# Patient Record
Sex: MALE | Race: WHITE | NOT HISPANIC OR LATINO | Employment: PART TIME | URBAN - METROPOLITAN AREA
[De-identification: names, ages, dates, MRNs, and addresses within clinical notes are randomized per-mention and may not be internally consistent; named-entity substitution may affect disease eponyms.]

---
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
## 2018-02-20 ENCOUNTER — APPOINTMENT (OUTPATIENT)
Dept: RADIOLOGY | Facility: MEDICAL CENTER | Age: 18
End: 2018-02-20
Payer: COMMERCIAL

## 2018-02-20 ENCOUNTER — TRANSCRIBE ORDERS (OUTPATIENT)
Dept: ADMINISTRATIVE | Facility: HOSPITAL | Age: 18
End: 2018-02-20

## 2018-02-20 DIAGNOSIS — M54.9 BACK PAIN, UNSPECIFIED BACK LOCATION, UNSPECIFIED BACK PAIN LATERALITY, UNSPECIFIED CHRONICITY: ICD-10-CM

## 2018-02-20 DIAGNOSIS — M41.119 JUVENILE IDIOPATHIC SCOLIOSIS, UNSPECIFIED SPINAL REGION: ICD-10-CM

## 2018-02-20 DIAGNOSIS — M54.9 BACK PAIN, UNSPECIFIED BACK LOCATION, UNSPECIFIED BACK PAIN LATERALITY, UNSPECIFIED CHRONICITY: Primary | ICD-10-CM

## 2018-02-20 PROCEDURE — 72082 X-RAY EXAM ENTIRE SPI 2/3 VW: CPT

## 2019-02-16 ENCOUNTER — HOSPITAL ENCOUNTER (INPATIENT)
Facility: HOSPITAL | Age: 19
LOS: 2 days | Discharge: HOME | DRG: 242 | End: 2019-02-18
Attending: EMERGENCY MEDICINE | Admitting: HOSPITALIST
Payer: COMMERCIAL

## 2019-02-16 DIAGNOSIS — R10.84 GENERALIZED ABDOMINAL PAIN: ICD-10-CM

## 2019-02-16 DIAGNOSIS — K92.2 UPPER GI BLEED: ICD-10-CM

## 2019-02-16 DIAGNOSIS — R63.4 UNINTENDED WEIGHT LOSS: ICD-10-CM

## 2019-02-16 DIAGNOSIS — I95.1 ORTHOSTATIC HYPOTENSION: ICD-10-CM

## 2019-02-16 DIAGNOSIS — K92.0 HEMATEMESIS WITH NAUSEA: Primary | ICD-10-CM

## 2019-02-16 DIAGNOSIS — K92.2 GI BLEED: ICD-10-CM

## 2019-02-16 DIAGNOSIS — Z72.0 TOBACCO USE: ICD-10-CM

## 2019-02-16 DIAGNOSIS — K92.0 COFFEE GROUND EMESIS: ICD-10-CM

## 2019-02-16 PROBLEM — M41.9 SCOLIOSIS: Status: ACTIVE | Noted: 2019-02-16

## 2019-02-16 PROBLEM — F12.10 MARIJUANA ABUSE: Status: ACTIVE | Noted: 2019-02-16

## 2019-02-16 LAB
ABO GROUP BLD: NORMAL
ALBUMIN SERPL BCP-MCNC: 4.4 G/DL (ref 3.5–5)
ALP SERPL-CCNC: 80 U/L (ref 46–116)
ALT SERPL W P-5'-P-CCNC: 21 U/L (ref 12–78)
ANION GAP SERPL CALCULATED.3IONS-SCNC: 10 MMOL/L (ref 4–13)
APTT PPP: 28 SECONDS (ref 26–38)
AST SERPL W P-5'-P-CCNC: 14 U/L (ref 5–45)
BACTERIA UR QL AUTO: ABNORMAL /HPF
BASOPHILS # BLD AUTO: 0.05 THOUSANDS/ΜL (ref 0–0.1)
BASOPHILS NFR BLD AUTO: 1 % (ref 0–1)
BILIRUB SERPL-MCNC: 0.92 MG/DL (ref 0.2–1)
BILIRUB UR QL STRIP: NEGATIVE
BLD GP AB SCN SERPL QL: NEGATIVE
BUN SERPL-MCNC: 11 MG/DL (ref 5–25)
CALCIUM SERPL-MCNC: 9 MG/DL (ref 8.3–10.1)
CHLORIDE SERPL-SCNC: 104 MMOL/L (ref 100–108)
CLARITY UR: ABNORMAL
CO2 SERPL-SCNC: 29 MMOL/L (ref 21–32)
COLOR UR: YELLOW
CREAT SERPL-MCNC: 0.77 MG/DL (ref 0.6–1.3)
EOSINOPHIL # BLD AUTO: 0.27 THOUSAND/ΜL (ref 0–0.61)
EOSINOPHIL NFR BLD AUTO: 5 % (ref 0–6)
ERYTHROCYTE [DISTWIDTH] IN BLOOD BY AUTOMATED COUNT: 11.9 % (ref 11.6–15.1)
GFR SERPL CREATININE-BSD FRML MDRD: 131 ML/MIN/1.73SQ M
GLUCOSE SERPL-MCNC: 96 MG/DL (ref 65–140)
GLUCOSE UR STRIP-MCNC: NEGATIVE MG/DL
HCT VFR BLD AUTO: 40.1 % (ref 36.5–49.3)
HGB BLD-MCNC: 13.5 G/DL (ref 12–17)
HGB UR QL STRIP.AUTO: ABNORMAL
IMM GRANULOCYTES # BLD AUTO: 0.01 THOUSAND/UL (ref 0–0.2)
IMM GRANULOCYTES NFR BLD AUTO: 0 % (ref 0–2)
INR PPP: 1.15 (ref 0.86–1.17)
KETONES UR STRIP-MCNC: NEGATIVE MG/DL
LACTATE SERPL-SCNC: 0.7 MMOL/L (ref 0.5–2)
LEUKOCYTE ESTERASE UR QL STRIP: NEGATIVE
LYMPHOCYTES # BLD AUTO: 1.61 THOUSANDS/ΜL (ref 0.6–4.47)
LYMPHOCYTES NFR BLD AUTO: 27 % (ref 14–44)
MCH RBC QN AUTO: 31 PG (ref 26.8–34.3)
MCHC RBC AUTO-ENTMCNC: 33.7 G/DL (ref 31.4–37.4)
MCV RBC AUTO: 92 FL (ref 82–98)
MONOCYTES # BLD AUTO: 0.51 THOUSAND/ΜL (ref 0.17–1.22)
MONOCYTES NFR BLD AUTO: 9 % (ref 4–12)
NEUTROPHILS # BLD AUTO: 3.44 THOUSANDS/ΜL (ref 1.85–7.62)
NEUTS SEG NFR BLD AUTO: 58 % (ref 43–75)
NITRITE UR QL STRIP: NEGATIVE
NON-SQ EPI CELLS URNS QL MICRO: ABNORMAL /HPF
NRBC BLD AUTO-RTO: 0 /100 WBCS
PH UR STRIP.AUTO: 6 [PH] (ref 4.5–8)
PLATELET # BLD AUTO: 225 THOUSANDS/UL (ref 149–390)
PMV BLD AUTO: 9.5 FL (ref 8.9–12.7)
POTASSIUM SERPL-SCNC: 3.5 MMOL/L (ref 3.5–5.3)
PROT SERPL-MCNC: 7.1 G/DL (ref 6.4–8.2)
PROT UR STRIP-MCNC: ABNORMAL MG/DL
PROTHROMBIN TIME: 14.8 SECONDS (ref 11.8–14.2)
RBC # BLD AUTO: 4.35 MILLION/UL (ref 3.88–5.62)
RBC #/AREA URNS AUTO: ABNORMAL /HPF
RH BLD: POSITIVE
SODIUM SERPL-SCNC: 143 MMOL/L (ref 136–145)
SP GR UR STRIP.AUTO: >=1.03 (ref 1–1.03)
SPECIMEN EXPIRATION DATE: NORMAL
UROBILINOGEN UR QL STRIP.AUTO: 0.2 E.U./DL
WBC # BLD AUTO: 5.89 THOUSAND/UL (ref 4.31–10.16)
WBC #/AREA URNS AUTO: ABNORMAL /HPF

## 2019-02-16 PROCEDURE — 96365 THER/PROPH/DIAG IV INF INIT: CPT

## 2019-02-16 PROCEDURE — 86900 BLOOD TYPING SEROLOGIC ABO: CPT | Performed by: NURSE PRACTITIONER

## 2019-02-16 PROCEDURE — 99285 EMERGENCY DEPT VISIT HI MDM: CPT

## 2019-02-16 PROCEDURE — 36415 COLL VENOUS BLD VENIPUNCTURE: CPT | Performed by: NURSE PRACTITIONER

## 2019-02-16 PROCEDURE — 81003 URINALYSIS AUTO W/O SCOPE: CPT

## 2019-02-16 PROCEDURE — 85025 COMPLETE CBC W/AUTO DIFF WBC: CPT | Performed by: NURSE PRACTITIONER

## 2019-02-16 PROCEDURE — 99222 1ST HOSP IP/OBS MODERATE 55: CPT | Performed by: PHYSICIAN ASSISTANT

## 2019-02-16 PROCEDURE — 80053 COMPREHEN METABOLIC PANEL: CPT | Performed by: NURSE PRACTITIONER

## 2019-02-16 PROCEDURE — 86901 BLOOD TYPING SEROLOGIC RH(D): CPT | Performed by: NURSE PRACTITIONER

## 2019-02-16 PROCEDURE — 83605 ASSAY OF LACTIC ACID: CPT | Performed by: NURSE PRACTITIONER

## 2019-02-16 PROCEDURE — C9113 INJ PANTOPRAZOLE SODIUM, VIA: HCPCS | Performed by: NURSE PRACTITIONER

## 2019-02-16 PROCEDURE — 85730 THROMBOPLASTIN TIME PARTIAL: CPT | Performed by: NURSE PRACTITIONER

## 2019-02-16 PROCEDURE — 85610 PROTHROMBIN TIME: CPT | Performed by: NURSE PRACTITIONER

## 2019-02-16 PROCEDURE — 81001 URINALYSIS AUTO W/SCOPE: CPT

## 2019-02-16 PROCEDURE — 84443 ASSAY THYROID STIM HORMONE: CPT | Performed by: PHYSICIAN ASSISTANT

## 2019-02-16 PROCEDURE — 86850 RBC ANTIBODY SCREEN: CPT | Performed by: NURSE PRACTITIONER

## 2019-02-16 RX ORDER — LORAZEPAM 2 MG/ML
0.5 INJECTION INTRAMUSCULAR EVERY 6 HOURS PRN
Status: DISCONTINUED | OUTPATIENT
Start: 2019-02-16 | End: 2019-02-18 | Stop reason: HOSPADM

## 2019-02-16 RX ORDER — PANTOPRAZOLE SODIUM 40 MG/1
40 INJECTION, POWDER, FOR SOLUTION INTRAVENOUS ONCE
Status: DISCONTINUED | OUTPATIENT
Start: 2019-02-16 | End: 2019-02-16

## 2019-02-16 RX ORDER — SODIUM CHLORIDE 9 MG/ML
100 INJECTION, SOLUTION INTRAVENOUS CONTINUOUS
Status: DISCONTINUED | OUTPATIENT
Start: 2019-02-16 | End: 2019-02-18

## 2019-02-16 RX ORDER — PANTOPRAZOLE SODIUM 40 MG/1
40 INJECTION, POWDER, FOR SOLUTION INTRAVENOUS EVERY 12 HOURS SCHEDULED
Status: DISCONTINUED | OUTPATIENT
Start: 2019-02-17 | End: 2019-02-18

## 2019-02-16 RX ORDER — ONDANSETRON 2 MG/ML
4 INJECTION INTRAMUSCULAR; INTRAVENOUS EVERY 6 HOURS PRN
Status: DISCONTINUED | OUTPATIENT
Start: 2019-02-16 | End: 2019-02-18 | Stop reason: HOSPADM

## 2019-02-16 RX ORDER — HYDROXYZINE HYDROCHLORIDE 10 MG/1
10 TABLET, FILM COATED ORAL ONCE
Status: COMPLETED | OUTPATIENT
Start: 2019-02-16 | End: 2019-02-16

## 2019-02-16 RX ADMIN — SODIUM CHLORIDE 100 ML/HR: 0.9 INJECTION, SOLUTION INTRAVENOUS at 21:22

## 2019-02-16 RX ADMIN — SODIUM CHLORIDE 80 MG: 9 INJECTION, SOLUTION INTRAVENOUS at 20:51

## 2019-02-16 RX ADMIN — HYDROXYZINE HYDROCHLORIDE 10 MG: 10 TABLET ORAL at 21:44

## 2019-02-16 NOTE — LETTER
[address] [address]  [address] [place] [id number]  Dept: [phone]    February 18, 2019     Patient: Sergio Anthony   YOB: 2000   Date of Visit: 2/16/2019       To Whom it May Concern:    Forest Jami is under my professional care  He was seen in the hospital from 2/16/2019   to 02/18/19  He may return to work on 2/22/19  If you have any questions or concerns, please don't hesitate to call           Sincerely,          Sagar Gonzalez MD

## 2019-02-17 ENCOUNTER — ANESTHESIA EVENT (INPATIENT)
Dept: GASTROENTEROLOGY | Facility: HOSPITAL | Age: 19
DRG: 242 | End: 2019-02-17
Payer: COMMERCIAL

## 2019-02-17 PROBLEM — K92.0 HEMATEMESIS WITH NAUSEA: Status: ACTIVE | Noted: 2019-02-16

## 2019-02-17 PROBLEM — K92.0 COFFEE GROUND EMESIS: Status: ACTIVE | Noted: 2019-02-16

## 2019-02-17 PROBLEM — K92.2 GI BLEED: Status: ACTIVE | Noted: 2019-02-16

## 2019-02-17 PROBLEM — R63.4 UNINTENDED WEIGHT LOSS: Status: ACTIVE | Noted: 2019-02-16

## 2019-02-17 LAB
HGB BLD-MCNC: 11.8 G/DL (ref 12–17)
HGB BLD-MCNC: 12.4 G/DL (ref 12–17)
HGB BLD-MCNC: 12.6 G/DL (ref 12–17)
TSH SERPL DL<=0.05 MIU/L-ACNC: 2.26 UIU/ML (ref 0.46–3.98)

## 2019-02-17 PROCEDURE — 85018 HEMOGLOBIN: CPT | Performed by: HOSPITALIST

## 2019-02-17 PROCEDURE — 99232 SBSQ HOSP IP/OBS MODERATE 35: CPT | Performed by: HOSPITALIST

## 2019-02-17 PROCEDURE — C9113 INJ PANTOPRAZOLE SODIUM, VIA: HCPCS | Performed by: PHYSICIAN ASSISTANT

## 2019-02-17 PROCEDURE — 99254 IP/OBS CNSLTJ NEW/EST MOD 60: CPT | Performed by: INTERNAL MEDICINE

## 2019-02-17 PROCEDURE — 85018 HEMOGLOBIN: CPT | Performed by: PHYSICIAN ASSISTANT

## 2019-02-17 RX ADMIN — SODIUM CHLORIDE 100 ML/HR: 0.9 INJECTION, SOLUTION INTRAVENOUS at 06:04

## 2019-02-17 RX ADMIN — PANTOPRAZOLE SODIUM 40 MG: 40 INJECTION, POWDER, FOR SOLUTION INTRAVENOUS at 08:55

## 2019-02-17 RX ADMIN — LORAZEPAM 0.5 MG: 2 INJECTION INTRAMUSCULAR; INTRAVENOUS at 10:38

## 2019-02-17 RX ADMIN — SODIUM CHLORIDE 100 ML/HR: 0.9 INJECTION, SOLUTION INTRAVENOUS at 16:01

## 2019-02-17 RX ADMIN — LORAZEPAM 0.5 MG: 2 INJECTION INTRAMUSCULAR; INTRAVENOUS at 19:25

## 2019-02-17 RX ADMIN — PANTOPRAZOLE SODIUM 40 MG: 40 INJECTION, POWDER, FOR SOLUTION INTRAVENOUS at 20:40

## 2019-02-17 NOTE — ED NOTES
Patient rang call light, requesting medication for anxiety  ANNA Ovalle Sample notified       Shahid Li RN  02/16/19 6276

## 2019-02-17 NOTE — ASSESSMENT & PLAN NOTE
Patient reports one episode of vomiting mixed coffee ground emesis with bright red blood this afternoon after taking 2 ibuprofen   Denies habitual use of NSAIDs or history of GI bleed  Reports history of anemia as a child, unknown cause- resolved spontaneously   Hemoccult positive rectally   HgB 13 5 on admission, serial H&H   NPO with IV fluid hydration   GI consult   IV protonix

## 2019-02-17 NOTE — PROGRESS NOTES
Progress Note - Jose Lowers 2000, 19 y o  male MRN: [id number]    Unit/Bed#: [facility] [address] 2 [address] 212-02 Encounter: [id number]    Primary Care Provider: Abraham Colon MD   Date and time admitted to hospital: 2019  7:55 PM        * Hematemesis/vomiting blood  Assessment & Plan  One episode of coffee ground emesis  No diarhea  No bloody nor dark stools  No NSAID use  No recent antibiotics  He has never had this problem before  Continue IV protonix  For EGD tomorrow    Marijuana abuse  Assessment & Plan  Needs to quit  Probably contributing to nausea          Subjective:   Feels better  Has mild epigastric pain to palpation  No further nausea or vomiting      Objective:     Vitals:   Temp (24hrs), Av 3 °F (36 8 °C), Min:97 9 °F (36 6 °C), Max:99 °F (37 2 °C)    Temp:  [97 9 °F (36 6 °C)-99 °F (37 2 °C)] 99 °F (37 2 °C)  HR:  [53-78] 53  Resp:  [14-18] 18  BP: ()/(53-67) 82/53  SpO2:  [95 %-98 %] 98 %  There is no height or weight on file to calculate BMI  Input and Output Summary (last 24 hours): Intake/Output Summary (Last 24 hours) at 2019 1252  Last data filed at 2019 0604  Gross per 24 hour   Intake 870 ml   Output --   Net 870 ml       Physical Exam:     Physical Exam   HENT:   Head: Normocephalic and atraumatic  Eyes: Pupils are equal, round, and reactive to light  EOM are normal    Cardiovascular: Normal rate and regular rhythm  Exam reveals no gallop and no friction rub  No murmur heard  Pulmonary/Chest: Effort normal and breath sounds normal  He has no wheezes  He has no rales  Abdominal: Soft  Bowel sounds are normal  There is tenderness (very mild epigastric tenderness to deep palpation  )  There is no rebound and no guarding  Musculoskeletal: He exhibits no edema  Nursing note and vitals reviewed              Additional Data:     Labs:    Results from last 7 days   Lab Units 19  0558 19   WBC Thousand/uL  --  5 89   HEMOGLOBIN g/dL 12 4 13 5   HEMATOCRIT %  --  40 1   PLATELETS Thousands/uL  --  225   NEUTROS PCT %  --  58   LYMPHS PCT %  --  27   MONOS PCT %  --  9   EOS PCT %  --  5     Results from last 7 days   Lab Units 02/16/19 2030   POTASSIUM mmol/L 3 5   CHLORIDE mmol/L 104   CO2 mmol/L 29   BUN mg/dL 11   CREATININE mg/dL 0 77   CALCIUM mg/dL 9 0   ALK PHOS U/L 80   ALT U/L 21   AST U/L 14     Results from last 7 days   Lab Units 02/16/19 2030   INR  1 15                   * I Have Reviewed All Lab Data     Recent Cultures (last 7 days):             Last 24 Hours Medication List:     Current Facility-Administered Medications:  LORazepam 0 5 mg Intravenous Q6H PRN Iman Leiva PA-C    nicotine 1 patch Transdermal Daily Dolly Yeceniade PA-JUANITO    ondansetron 4 mg Intravenous Q6H PRN Dolly Smashkande, PA-C    pantoprazole 40 mg Intravenous Q12H [address] Dolly Smudde, PA-C    sodium chloride 100 mL/hr Intravenous Continuous Dolly Smashkande, PA-C Last Rate: 100 mL/hr (02/17/19 0604)         VTE Pharmacologic Prophylaxis:   Pharmacologic: ambulating      Current Length of Stay: 1 day(s)    Current Patient Status: Inpatient       Discharge Plan: home tomorrow after EGD depending on results    Code Status: Level 1 - Full Code           Today, Patient Was Seen By: Dennys Pool DO    ** Please Note: Dictation voice to text software may have been used in the creation of this document   **

## 2019-02-17 NOTE — UTILIZATION REVIEW
Initial Clinical Review    Admission: Date/Time/Statement: 2/16/19 @ 2128   Orders Placed This Encounter   Procedures    Inpatient Admission (expected length of stay for this patient Order details is greater than two midnights)     Standing Status:   Standing     Number of Occurrences:   1     Order Specific Question:   Admitting Physician     Answer:   Domitila Small [[id number]]     Order Specific Question:   Level of Care     Answer:   Med Surg [16]     Order Specific Question:   Estimated length of stay     Answer:   More than 2 Midnights     Order Specific Question:   Certification     Answer:   I certify that inpatient services are medically necessary for this patient for a duration of greater than two midnights  See H&P and MD Progress Notes for additional information about the patient's course of treatment  ED: Date/Time/Mode of Arrival:   ED Arrival Information     Expected Arrival Acuity Means of Arrival Escorted By Service Admission Type    - 2/16/2019 19:47 Urgent Walk-In Family Member General Medicine Urgent    Arrival Complaint    vomiting blood        Chief Complaint:   Chief Complaint   Patient presents with    Vomiting Blood     pt states he started with headache with vomiting that was dark and bright red  feels dizzy at this time  also reports mid abdominal pain  History of Illness: 19year old male with a PMH of scoliosis and unknown type of anemia who smokes marijuana daily and vapes who comes to the ED with c/o vomiting coffee ground emesis and blood at work  He states he had a headache, took some ibuprofen while at work, became nauseated and them vomited  He called his mother and was told to come to ED  Pt states he does have an uncomfortable feeling in his stomach  He denies alcohol use or daily use of NSAIDS  Denies cyclic vomiting related to marijuana use  Denies noting any blood in his stool   Last BM today and was normal   Pt states he notices dizziness and lightheadedness with getting up and down      ED Vital Signs:   ED Triage Vitals   Temperature Pulse Respirations Blood Pressure SpO2   02/16/19 1952 02/16/19 1952 02/16/19 1952 02/16/19 1952 02/16/19 1952   97 9 °F (36 6 °C) 78 14 118/67 97 %      Temp Source Heart Rate Source Patient Position - Orthostatic VS BP Location FiO2 (%)   02/16/19 1952 02/17/19 0759 02/16/19 1952 02/16/19 1952 --   Temporal Monitor Sitting Right arm       Pain Score       02/16/19 2154       2        Wt Readings from Last 1 Encounters:   02/16/19 48 6 kg (107 lb 2 3 oz)     H/H 13 5/40 1  PT 14 8  INR 1 15  URINE  SMALL BLOOD  (+) 1 PROTEIN RBC 2-4  WBC  4-10      ED Treatment:   Medication Administration from 02/16/2019 1947 to 02/16/2019 2211       Date/Time Order Dose Route Action Action by Comments     02/16/2019 2122 pantoprazole (PROTONIX) 80 mg in sodium chloride 0 9 % 100 mL IVPB 0 mg Intravenous Stopped Gerson Brown, RN      02/16/2019 2051 pantoprazole (PROTONIX) 80 mg in sodium chloride 0 9 % 100 mL IVPB 80 mg Intravenous [address] Gaynel Levels, RN      02/16/2019 2122 sodium chloride 0 9 % infusion 100 mL/hr Intravenous [address] Gaynel Levels, RN      02/16/2019 2144 hydrOXYzine HCL (ATARAX) tablet 10 mg 10 mg Oral Given Gerson Brown, RN         Past Medical/Surgical History:    Active Ambulatory Problems     Diagnosis Date Noted    No Active Ambulatory Problems     Resolved Ambulatory Problems     Diagnosis Date Noted    No Resolved Ambulatory Problems     Past Medical History:   Diagnosis Date    Anemia     Scoliosis      Admitting Diagnosis: Vomiting blood [K92 0]  Generalized abdominal pain [R10 84]  GI bleed [K92 2]  Coffee ground emesis [K92 0]  Hematemesis with nausea [K92 0]  Age/Sex: 19 y o  male  Assessment/Plan: 18 YO MALE PRESENT IN ER WITH COFFEE GROUND EMESIS  ABD TENDERNESS    Admission Orders:  Scheduled Meds:   Current Facility-Administered Medications:  LORazepam 0 5 mg Intravenous Q6H PRN Dolly Monique PA-C    nicotine 1 patch Transdermal Daily Dolly Monique PA-C    ondansetron 4 mg Intravenous Q6H PRN Dolly Monique PA-C    pantoprazole 40 mg Intravenous Q12H [facility] Dolly Monique PA-C    sodium chloride 100 mL/hr Intravenous Continuous Dolly Monique PA-C Last Rate: 100 mL/hr (02/17/19 0604)     Continuous Infusions:   sodium chloride 100 mL/hr Last Rate: 100 mL/hr (02/17/19 0604)     CLEARS    NPO AT MIDNIGHT FOR UPPER ENDOSCOPY    HEMGLOBIN Q 8 HRS

## 2019-02-17 NOTE — CONSULTS
Consultation -  [facility]  Champ Leon 19 y o  male MRN: [id number]  Unit/Bed#: [facility] [address] [id number] Encounter: [id number]        ASSESSMENT/PLAN:   70-year-old male with past medical history of scoliosis and anxiety presents to the hospital for 1 episode of coffee-ground and bloody emesis  1  Hematemesis  2  Unintended weight loss  3  Drug abuse   -his hemoglobin has been stable at 13 5, he has had no further episodes of hematemesis or vomiting since admission to the hospital   He denies any similar prior episodes or chronic illness    -differential diagnosis is broad but does include peptic ulcer disease, Dieulafoy's lesion, gastric or esophageal erosions less likely a Umu-Griffin tear from his history  He denies history of heartburn and does not have any significant identified risk factors for PUD aside from nicotine use  -recommend an upper endoscopy for further evaluation, discussed this with him and his parents and he is agreeable  Will plan for this tomorrow, NPO after midnight    -advance diet to clear liquids, if his hemoglobin remained stable today, could consider advancing diet further dinner    -ordered TSH, celiac panel and fecal calprotectin to further evaluate his unintended weight loss     -ordered stool H pylori antigen test, this may be falsely negative in the setting of bleeding and PPI treatment however gastric biopsies may not be possible in the setting of current GI bleeding     -continue Protonix for empiric treatment of PUD/erosions   -continue to monitor bowel movements for color and consistency, continue monitor hemoglobin and transfuse if necessary    -counselled him regarding drug and smoking cessation    Inpatient consult to gastroenterology  Consult performed by: Mechelle Ann PA-C  Consult ordered by: Talia Merino PA-C          Reason for Consult / Principal Problem: Hematemesis/vomiting blood    HPI: Son Montaño is a 19 y o  year old male with a PMH of scoliosis and anxiety presents to the hospital for 1 episode of coffee-ground and bloody emesis  He reports that yesterday at work he had a headache so he took 1 dose of ibuprofen and shortly after had nausea followed by vomiting with black, coffee-ground appearing material as well as dark red blood in the vomitus  He has had no further episodes of vomiting and denies previous episodes of vomiting  He denies any hematochezia or melena  He states he felt somewhat lightheaded on admission to the hospital but is feeling slightly improved now  He reports no similar episodes to this although he does note that he lost about 10 lb over the past several months, he states that he had a GI bug that lasted approximately 2 weeks late last year with complete resolution of his symptoms  He has changed his diet recently, he has decreased his meat intake as he states that meat seems to make him lethargic and he finds it difficult to work after eating meat  He denies abdominal pain, change in appetite, difficulty swallowing, hematochezia, melena, change in bowel movements, constipation, diarrhea, fevers, chills, rashes or joint aches  He states he does not take ibuprofen on a regular basis  He does not take any steroids  He is not on any blood thinners  He denies family history of Crohn's or ulcerative colitis  He denies family history of GI malignancy  He currently smokes marijuana as well as "vapes" which contains nicotine  Review of Systems: as per HPI  Review of Systems   Constitutional: Positive for unexpected weight change  Negative for activity change, appetite change, chills, fatigue and fever  HENT: Negative for mouth sores, sore throat and trouble swallowing  Respiratory: Negative for shortness of breath  Cardiovascular: Negative for chest pain  Gastrointestinal: Positive for vomiting   Negative for abdominal distention, abdominal pain, blood in stool, constipation, diarrhea and nausea  Skin: Negative for color change, pallor, rash and wound  Neurological: Negative for tremors and syncope  All other systems reviewed and are negative  Historical Information   Past Medical History:   Diagnosis Date    Anemia     Scoliosis      History reviewed  No pertinent surgical history  Social History   Social History     Substance and Sexual Activity   Alcohol Use Yes     Social History     Substance and Sexual Activity   Drug Use No     Social History     Tobacco Use   Smoking Status Current Every Day Smoker    Packs/day: 0 25    Types: Cigarettes   Smokeless Tobacco Never Used   Tobacco Comment    vapes     History reviewed  No pertinent family history  Meds/Allergies     No medications prior to admission  Current Facility-Administered Medications   Medication Dose Route Frequency    LORazepam (ATIVAN) 2 mg/mL injection 0 5 mg  0 5 mg Intravenous Q6H PRN    nicotine (NICODERM CQ) 7 mg/24hr TD 24 hr patch 1 patch  1 patch Transdermal Daily    ondansetron (ZOFRAN) injection 4 mg  4 mg Intravenous Q6H PRN    pantoprazole (PROTONIX) injection 40 mg  40 mg Intravenous Q12H [address]    sodium chloride 0 9 % infusion  100 mL/hr Intravenous Continuous       No Known Allergies    Objective     Blood pressure (!) 82/53, pulse (!) 53, temperature 99 °F (37 2 °C), temperature source Temporal, resp  rate 18, weight 48 6 kg (107 lb 2 3 oz), SpO2 98 %  Intake/Output Summary (Last 24 hours) at 2/17/2019 1310  Last data filed at 2/17/2019 0604  Gross per 24 hour   Intake 870 ml   Output --   Net 870 ml       PHYSICAL EXAM     Physical Exam   Constitutional: He is oriented to person, place, and time  No distress  thin   HENT:   Head: Normocephalic and atraumatic  Eyes: Right eye exhibits no discharge  Left eye exhibits no discharge  No scleral icterus  Neck: Neck supple  No tracheal deviation present     Cardiovascular: Normal rate, normal heart sounds and intact distal pulses  Exam reveals no gallop and no friction rub  No murmur heard  Sinus arrhythmia    Pulmonary/Chest: Effort normal and breath sounds normal  No respiratory distress  He has no wheezes  He has no rales  He exhibits no tenderness  Abdominal: Soft  Bowel sounds are normal  He exhibits no distension and no mass  There is no tenderness  There is no rebound and no guarding  Neurological: He is alert and oriented to person, place, and time  Skin: Skin is warm and dry  Psychiatric: He has a normal mood and affect  Lab Results:   CBC:   Lab Results   Component Value Date    WBC 5 89 02/16/2019    HGB 12 4 02/17/2019    HCT 40 1 02/16/2019    MCV 92 02/16/2019     02/16/2019    MCH 31 0 02/16/2019    MCHC 33 7 02/16/2019    RDW 11 9 02/16/2019    MPV 9 5 02/16/2019    NRBC 0 02/16/2019   ,   CMP:   Lab Results   Component Value Date    K 3 5 02/16/2019     02/16/2019    CO2 29 02/16/2019    BUN 11 02/16/2019    CREATININE 0 77 02/16/2019    CALCIUM 9 0 02/16/2019    AST 14 02/16/2019    ALT 21 02/16/2019    ALKPHOS 80 02/16/2019    EGFR 131 02/16/2019   ,   Lipase: No results found for: LIPASE,  PT/INR:   Lab Results   Component Value Date    INR 1 15 02/16/2019       Patient was seen and examined by Dr Ciara Thompson  All cruz medical decisions were made by Dr Ciara Thompson  Thank you for allowing us to participate in the care of this present patient  We will follow-up with you closely

## 2019-02-17 NOTE — H&P
[address] [facility]  H&P- Gloria Grover 2000, 19 y o  male MRN: [id number]  Unit/Bed#: [facility] Encounter: [id number]  Primary Care Provider: Lincoln Willson MD   Date and time admitted to hospital: 2/16/2019  7:55 PM    Marijuana abuse  Assessment & Plan  Patient reports daily marijuana use   Recommend cessation at discharge     Scoliosis  Assessment & Plan  Patient reports chronic back pain  Denies frequent NSAID use       Tobacco use  Assessment & Plan  Patient reports vaping daily   Nicotine patch while inpatient       * Hematemesis/vomiting blood  Assessment & Plan  Patient reports one episode of vomiting mixed coffee ground emesis with bright red blood this afternoon after taking 2 ibuprofen   Denies habitual use of NSAIDs or history of GI bleed  Reports history of anemia as a child, unknown cause- resolved spontaneously   Hemoccult positive rectally   HgB 13 5 on admission, serial H&H   NPO with IV fluid hydration   GI consult   IV protonix          VTE Prophylaxis: Pharmacologic VTE Prophylaxis contraindicated due to GI bleed  / reason for no mechanical VTE prophylaxis low risk    Code Status: full code  POLST: POLST form is not discussed and not completed at this time  Discussion with family: mother at bedside     Anticipated Length of Stay:  Patient will be admitted on an Inpatient basis with an anticipated length of stay of  More than 2 midnights  Justification for Hospital Stay: GI bleed     Total Time for Visit, including Counseling / Coordination of Care: 45 minutes  Greater than 50% of this total time spent on direct patient counseling and coordination of care  Chief Complaint:   Vomiting blood     History of Present Illness:    Gloria Grover is a 19 y o  male who presents with one episode of vomiting coffee ground emesis mixed with bright red blood this afternoon  Patient reports he was working as a  at a [employer] when he developed a HA   Patient reports he took 400 mg of ibuprofen for his headache  Patient reports a few minutes later he developed abdominal pain and vomited once  Patient reports the vomit was black with streaks of red blood  Patient reports he had last eaten lunch at 11 am and this occurred a few hours after  Patient reports his headache has gone away but reports he still has some abdominal discomfort  Patient describes the discomfort as cramping mostly on his right side  Patient denies fevers, constipation, diarrhea or dizziness at this time  Patient does report feeling slightly dizzy with standing but reports this is not severe and goes away quickly  Patient denies any visible blood in stool, reports last BM was this AM and was normal  Patient denies any chest pain or shortness of breath  Patient's mother reports patient has been very stressed recently and reports she believes this may have something to do with today's events  Review of Systems:    Review of Systems   Constitutional: Negative for activity change, fatigue and fever  HENT: Negative for ear pain, nosebleeds, sinus pressure, sneezing, sore throat and trouble swallowing  Eyes: Negative for photophobia, pain and visual disturbance  Respiratory: Negative for cough, chest tightness, shortness of breath and wheezing  Cardiovascular: Negative for chest pain, palpitations and leg swelling  Gastrointestinal: Positive for abdominal pain and vomiting  Negative for diarrhea and nausea  Endocrine: Negative  Genitourinary: Negative for difficulty urinating, dysuria, flank pain and hematuria  Musculoskeletal: Positive for back pain  Negative for gait problem and neck stiffness  Skin: Negative for rash  Allergic/Immunologic: Negative  Neurological: Negative for syncope, speech difficulty, weakness and light-headedness  Hematological: Negative  Psychiatric/Behavioral: Negative for confusion and sleep disturbance     All other systems reviewed and are negative  Past Medical and Surgical History:     Past Medical History:   Diagnosis Date    Anemia     Scoliosis        History reviewed  No pertinent surgical history  Meds/Allergies:    Prior to Admission medications    Not on File     I have reviewed home medications with patient personally  Allergies: No Known Allergies    Social History:     Marital Status: Single   Occupation: line   Patient Pre-hospital Living Situation: home with family   Patient Pre-hospital Level of Mobility: ambulatory  Patient Pre-hospital Diet Restrictions: none  Substance Use History:   Social History     Substance and Sexual Activity   Alcohol Use Yes     Social History     Tobacco Use   Smoking Status Current Every Day Smoker    Packs/day: 0 25    Types: Cigarettes   Smokeless Tobacco Never Used   Tobacco Comment    vapes     Social History     Substance and Sexual Activity   Drug Use No       Family History:    History reviewed  No pertinent family history  Physical Exam:     Vitals:   Blood Pressure: 112/64 (02/16/19 2154)  Pulse: 63 (02/16/19 2154)  Temperature: 97 9 °F (36 6 °C) (02/16/19 1952)  Temp Source: Temporal (02/16/19 1952)  Respirations: 16 (02/16/19 2154)  Weight - Scale: 48 3 kg (106 lb 7 7 oz) (02/16/19 2031)  SpO2: 95 % (02/16/19 2154)    Physical Exam   Constitutional: He is oriented to person, place, and time  He is cooperative  He does not have a sickly appearance  No distress  Thin    HENT:   Head: Normocephalic and atraumatic  Mouth/Throat: Oropharynx is clear and moist    Eyes: Conjunctivae and EOM are normal  No scleral icterus  Neck: Normal range of motion  Neck supple  No JVD present  Cardiovascular: Normal rate, regular rhythm and normal heart sounds  No murmur heard  Pulmonary/Chest: Effort normal and breath sounds normal  No respiratory distress  He has no wheezes  Abdominal: Soft  Bowel sounds are normal  He exhibits no distension   There is tenderness (right upper and lower quadrant )  There is no rebound and no guarding  Musculoskeletal: He exhibits deformity (scoliosis )  He exhibits no edema  Neurological: He is alert and oriented to person, place, and time  Skin: Skin is warm and dry  Psychiatric: He has a normal mood and affect  His behavior is normal  Thought content normal    Vitals reviewed  Additional Data:     Lab Results: I have personally reviewed pertinent reports  Results from last 7 days   Lab Units 02/16/19 2030   WBC Thousand/uL 5 89   HEMOGLOBIN g/dL 13 5   HEMATOCRIT % 40 1   PLATELETS Thousands/uL 225   NEUTROS PCT % 58   LYMPHS PCT % 27   MONOS PCT % 9   EOS PCT % 5     Results from last 7 days   Lab Units 02/16/19 2030   SODIUM mmol/L 143   POTASSIUM mmol/L 3 5   CHLORIDE mmol/L 104   CO2 mmol/L 29   BUN mg/dL 11   CREATININE mg/dL 0 77   ANION GAP mmol/L 10   CALCIUM mg/dL 9 0   ALBUMIN g/dL 4 4   TOTAL BILIRUBIN mg/dL 0 92   ALK PHOS U/L 80   ALT U/L 21   AST U/L 14   GLUCOSE RANDOM mg/dL 96     Results from last 7 days   Lab Units 02/16/19 2030   INR  1 15             Results from last 7 days   Lab Units 02/16/19 2030   LACTIC ACID mmol/L 0 7       Imaging: I have personally reviewed pertinent reports  No orders to display     [employer] Records Reviewed: Yes     ** Please Note: This note has been constructed using a voice recognition system   **

## 2019-02-17 NOTE — ED PROVIDER NOTES
History  Chief Complaint   Patient presents with    Vomiting Blood     pt states he started with headache with vomiting that was dark and bright red  feels dizzy at this time  also reports mid abdominal pain  This is a very frail thin tall appearing 19year old male with a PMH of scoliosis and unknown type of anemia who smokes marijuana daily and vapes who comes to the ED with c/o vomiting coffee ground emesis and blood at work  He states he had a headache, took some ibuprofen while at work, became nauseated and them vomited  He called his mother and was told to come to ED  Pt states he does have an uncomfortable feeling in his stomach  He denies alcohol use or daily use of NSAIDS  Denies cyclic vomiting related to marijuana use  Denies noting any blood in his stool  Last BM today and was normal   Pt states he notices dizziness and lightheadedness with getting up and down  History provided by:  Patient, parent and medical records   used: No        None       Past Medical History:   Diagnosis Date    Anemia     Scoliosis        History reviewed  No pertinent surgical history  History reviewed  No pertinent family history  I have reviewed and agree with the history as documented  Social History     Tobacco Use    Smoking status: Current Every Day Smoker     Packs/day: 0 25     Types: Cigarettes    Smokeless tobacco: Never Used    Tobacco comment: vapes   Substance Use Topics    Alcohol use: Yes    Drug use: No        Review of Systems   Constitutional: Negative  HENT: Negative  Eyes: Negative  Respiratory: Negative  Cardiovascular: Negative  Gastrointestinal: Positive for abdominal pain, nausea and vomiting  Negative for anal bleeding, blood in stool and diarrhea  Endocrine: Negative  Genitourinary: Negative  Musculoskeletal: Negative  Skin: Negative  Allergic/Immunologic: Negative      Neurological: Positive for dizziness and light-headedness  Hematological: Negative  Psychiatric/Behavioral: Negative  Physical Exam  Physical Exam   Constitutional: He is oriented to person, place, and time  No distress  Very thin, frail appearing young male    HENT:   Head: Normocephalic  Eyes: Pupils are equal, round, and reactive to light  EOM are normal    Neck: Normal range of motion  Neck supple  Cardiovascular: Normal rate, regular rhythm and normal heart sounds  Pulmonary/Chest: Effort normal and breath sounds normal  No stridor  No respiratory distress  He has no wheezes  He has no rales  He exhibits no tenderness  Abdominal: Soft  Bowel sounds are normal  He exhibits no distension  There is tenderness  Genitourinary: Rectal exam shows guaiac positive stool  Genitourinary Comments: Rectal exam performed and accompanied by RN Anjali  Musculoskeletal: Normal range of motion  Neurological: He is alert and oriented to person, place, and time  Skin: Skin is warm and dry  Capillary refill takes less than 2 seconds  He is not diaphoretic  Psychiatric: He has a normal mood and affect  His behavior is normal  Judgment and thought content normal    Nursing note and vitals reviewed        Vital Signs  ED Triage Vitals   Temperature Pulse Respirations Blood Pressure SpO2   02/16/19 1952 02/16/19 1952 02/16/19 1952 02/16/19 1952 02/16/19 1952   97 9 °F (36 6 °C) 78 14 118/67 97 %      Temp Source Heart Rate Source Patient Position - Orthostatic VS BP Location FiO2 (%)   02/16/19 1952 -- 02/16/19 1952 02/16/19 1952 --   Temporal  Sitting Right arm       Pain Score       02/16/19 2154       2           Vitals:    02/16/19 1952 02/16/19 2154 02/16/19 2210   BP: 118/67 112/64 110/67   Pulse: 78 63 61   Patient Position - Orthostatic VS: Sitting  Lying       Visual Acuity      ED Medications  Medications   sodium chloride 0 9 % infusion (100 mL/hr Intravenous New Bag 2/16/19 2122)   LORazepam (ATIVAN) 2 mg/mL injection 0 5 mg (has no administration in time range)   ondansetron (ZOFRAN) injection 4 mg (has no administration in time range)   nicotine (NICODERM CQ) 7 mg/24hr TD 24 hr patch 1 patch (has no administration in time range)   pantoprazole (PROTONIX) injection 40 mg (has no administration in time range)   pantoprazole (PROTONIX) 80 mg in sodium chloride 0 9 % 100 mL IVPB (0 mg Intravenous Stopped 2/16/19 2122)   hydrOXYzine HCL (ATARAX) tablet 10 mg (10 mg Oral Given 2/16/19 2144)       Diagnostic Studies  Results Reviewed     Procedure Component Value Units Date/Time    Urine Microscopic [[id number]]  (Abnormal) Collected:  02/16/19 2026    Lab Status:  Final result Specimen:  Urine, Clean Catch Updated:  02/16/19 2113     RBC, UA 2-4 /hpf      WBC, UA 4-10 /hpf      Epithelial Cells Occasional /hpf      Bacteria, UA Occasional /hpf     Lactic acid, plasma [[id number]]  (Normal) Collected:  02/16/19 2030    Lab Status:  Final result Specimen:  Blood from Arm, Right Updated:  02/16/19 2106     LACTIC ACID 0 7 mmol/L     Narrative:       Result may be elevated if tourniquet was used during collection  Comprehensive metabolic panel [[id number]] Collected:  02/16/19 2030    Lab Status:  Final result Specimen:  Blood from Arm, Right Updated:  02/16/19 2103     Sodium 143 mmol/L      Potassium 3 5 mmol/L      Chloride 104 mmol/L      CO2 29 mmol/L      ANION GAP 10 mmol/L      BUN 11 mg/dL      Creatinine 0 77 mg/dL      Glucose 96 mg/dL      Calcium 9 0 mg/dL      AST 14 U/L      ALT 21 U/L      Alkaline Phosphatase 80 U/L      Total Protein 7 1 g/dL      Albumin 4 4 g/dL      Total Bilirubin 0 92 mg/dL      eGFR 131 ml/min/1 73sq m     Narrative:       National Kidney Disease Education Program recommendations are as follows:  GFR calculation is accurate only with a steady state creatinine  Chronic Kidney disease less than 60 ml/min/1 73 sq  meters  Kidney failure less than 15 ml/min/1 73 sq  meters      Protime-INR [[id number]]  (Abnormal) Collected:  02/16/19 2030    Lab Status:  Final result Specimen:  Blood from Arm, Right Updated:  02/16/19 2057     Protime 14 8 seconds      INR 1 15    APTT [[id number]]  (Normal) Collected:  02/16/19 2030    Lab Status:  Final result Specimen:  Blood from Arm, Right Updated:  02/16/19 2057     PTT 28 seconds     CBC and differential [[id number]] Collected:  02/16/19 2030    Lab Status:  Final result Specimen:  Blood from Arm, Right Updated:  02/16/19 2040     WBC 5 89 Thousand/uL      RBC 4 35 Million/uL      Hemoglobin 13 5 g/dL      Hematocrit 40 1 %      MCV 92 fL      MCH 31 0 pg      MCHC 33 7 g/dL      RDW 11 9 %      MPV 9 5 fL      Platelets 745 Thousands/uL      nRBC 0 /100 WBCs      Neutrophils Relative 58 %      Immat GRANS % 0 %      Lymphocytes Relative 27 %      Monocytes Relative 9 %      Eosinophils Relative 5 %      Basophils Relative 1 %      Neutrophils Absolute 3 44 Thousands/µL      Immature Grans Absolute 0 01 Thousand/uL      Lymphocytes Absolute 1 61 Thousands/µL      Monocytes Absolute 0 51 Thousand/µL      Eosinophils Absolute 0 27 Thousand/µL      Basophils Absolute 0 05 Thousands/µL     POCT urinalysis dipstick [[id number]]  (Abnormal) Resulted:  02/16/19 2027    Lab Status:  Final result Specimen:  Urine Updated:  02/16/19 2027    ED Urine Macroscopic [[id number]]  (Abnormal) Collected:  02/16/19 2026    Lab Status:  Final result Specimen:  Urine Updated:  02/16/19 2026     Color, UA Yellow     Clarity, UA Cloudy     pH, UA 6 0     Leukocytes, UA Negative     Nitrite, UA Negative     Protein, UA 30 (1+) mg/dl      Glucose, UA Negative mg/dl      Ketones, UA Negative mg/dl      Urobilinogen, UA 0 2 E U /dl      Bilirubin, UA Negative     Blood, UA Small     Specific [place], UA >=1 030    Narrative:       CLINITEK RESULT    Occult blood gastric / duodenum [[id number]]     Lab Status:  No result Specimen:  Other from Stomach                  No orders to display Procedures  Procedures       Phone Contacts  ED Phone Contact    ED Course  ED Course as of Feb 16 2252   Sat Feb 16, 2019   2106 Hgb 13 5  - stable  Pt has  had no vomiting while in ED  Pt would benefit from admission vs observation for serial H&H to monitor for bleeding and hematemesis  Pt agrees with POC  Page to [employer]         2116 Pt is requesting something for anxiety  2128 [facility] will accept for admission  MDM  Number of Diagnoses or Management Options  Diagnosis management comments: Differential diagnosis  Upper and lower GI bleed   Anemia  Bleeding ulcer  DUB      Plan  Labs  protonix  NPO  IV  Admit for GI consult          Amount and/or Complexity of Data Reviewed  Clinical lab tests: ordered and reviewed  Review and summarize past medical records: yes        Disposition  Final diagnoses:   Hematemesis with nausea   Coffee ground emesis   GI bleed   Generalized abdominal pain     Time reflects when diagnosis was documented in both MDM as applicable and the Disposition within this note     Time User Action Codes Description Comment    2/16/2019  9:19 PM Heddie Arena Add [K92 0] Hematemesis with nausea     2/16/2019  9:19 PM Jose Lango [K92 0] Coffee ground emesis     2/16/2019  9:19 PM Heddie Arena Add [K92 2] GI bleed     2/16/2019  9:19 PM Heddie Arena Add [R10 84] Generalized abdominal pain       ED Disposition     ED Disposition Condition Date/Time Comment    Admit Stable Sat Feb 16, 2019  9:31 PM Case was discussed with CELESTINA/Dolly VILLEDA  and the patient's admission status was In patient agreed to be  to the service of Dr Bethany Gómez    None         There are no discharge medications for this patient  No discharge procedures on file      ED Provider  Electronically Signed by           Ken Ramachandran  02/16/19 5229

## 2019-02-17 NOTE — PLAN OF CARE
Problem: Nutrition/Hydration-ADULT  Goal: Nutrient/Hydration intake appropriate for improving, restoring or maintaining nutritional needs  Description  Monitor and assess patient's nutrition/hydration status for malnutrition (ex- brittle hair, bruises, dry skin, pale skin and conjunctiva, muscle wasting, smooth red tongue, and disorientation)  Collaborate with interdisciplinary team and initiate plan and interventions as ordered  Monitor patient's weight and dietary intake as ordered or per policy  Utilize nutrition screening tool and intervene per policy  Determine patient's food preferences and provide high-protein, high-caloric foods as appropriate       INTERVENTIONS:  - Monitor oral intake, urinary output, labs, and treatment plans  - Assess nutrition and hydration status and recommend course of action  - Evaluate amount of meals eaten  - Assist patient with eating if necessary   - Allow adequate time for meals  - Recommend/ encourage appropriate diets, oral nutritional supplements, and vitamin/mineral supplements  - Order, calculate, and assess calorie counts as needed  - Recommend, monitor, and adjust tube feedings and TPN/PPN based on assessed needs  - Assess need for intravenous fluids  - Provide specific nutrition/hydration education as appropriate  - Include patient/family/caregiver in decisions related to nutrition  Outcome: Progressing     Problem: GASTROINTESTINAL - ADULT  Goal: Minimal or absence of nausea and/or vomiting  Description  INTERVENTIONS:  - Administer IV fluids as ordered to ensure adequate hydration  - Maintain NPO status until nausea and vomiting are resolved  - Nasogastric tube as ordered  - Administer ordered antiemetic medications as needed  - Provide nonpharmacologic comfort measures as appropriate  - Advance diet as tolerated, if ordered  - Nutrition services referral to assist patient with adequate nutrition and appropriate food choices  Outcome: Progressing  Goal: Maintains adequate nutritional intake  Description  INTERVENTIONS:  - Monitor percentage of each meal consumed  - Identify factors contributing to decreased intake, treat as appropriate  - Assist with meals as needed  - Monitor I&O, WT and lab values  - Obtain nutrition services referral as needed  Outcome: Progressing     Problem: METABOLIC, FLUID AND ELECTROLYTES - ADULT  Goal: Electrolytes maintained within normal limits  Description  INTERVENTIONS:  - Monitor labs and assess patient for signs and symptoms of electrolyte imbalances  - Administer electrolyte replacement as ordered  - Monitor response to electrolyte replacements, including repeat lab results as appropriate  - Instruct patient on fluid and nutrition as appropriate  Outcome: Progressing  Goal: Fluid balance maintained  Description  INTERVENTIONS:  - Monitor labs and assess for signs and symptoms of volume excess or deficit  - Monitor I/O and WT  - Instruct patient on fluid and nutrition as appropriate  Outcome: Progressing     Problem: HEMATOLOGIC - ADULT  Goal: Maintains hematologic stability  Description  INTERVENTIONS  - Assess for signs and symptoms of bleeding or hemorrhage  - Monitor labs  - Administer supportive blood products/factors as ordered and appropriate  Outcome: Progressing

## 2019-02-17 NOTE — ANESTHESIA PREPROCEDURE EVALUATION
Review of Systems/Medical History  Patient summary reviewed        Cardiovascular  Negative cardio ROS    Pulmonary  Negative pulmonary ROS        GI/Hepatic      Comment: I episode of hematemisis     Negative  ROS        Endo/Other  Negative endo/other ROS      GYN  Negative gynecology ROS          Hematology  Anemia ,    Comment:  Past hx of anemia as a child   Musculoskeletal  Negative musculoskeletal ROS        Neurology  Negative neurology ROS      Psychology           Physical Exam    Airway    Mallampati score: II  TM Distance: >3 FB  Neck ROM: full     Dental   No notable dental hx     Cardiovascular  Comment: Negative ROS, Rhythm: regular, Rate: normal, Cardiovascular exam normal    Pulmonary  Pulmonary exam normal Breath sounds clear to auscultation,     Other Findings        Anesthesia Plan  ASA Score- 2     Anesthesia Type- IV sedation with anesthesia and general with ASA Monitors  Additional Monitors:   Airway Plan:         Plan Factors-Patient not instructed to abstain from smoking on day of procedure       Induction- intravenous  Postoperative Plan-     Informed Consent- Anesthetic plan and risks discussed with patient

## 2019-02-18 ENCOUNTER — ANESTHESIA (INPATIENT)
Dept: GASTROENTEROLOGY | Facility: HOSPITAL | Age: 19
DRG: 242 | End: 2019-02-18
Payer: COMMERCIAL

## 2019-02-18 VITALS
WEIGHT: 107 LBS | HEIGHT: 72 IN | RESPIRATION RATE: 18 BRPM | TEMPERATURE: 98.6 F | BODY MASS INDEX: 14.49 KG/M2 | HEART RATE: 54 BPM | DIASTOLIC BLOOD PRESSURE: 60 MMHG | OXYGEN SATURATION: 98 % | SYSTOLIC BLOOD PRESSURE: 103 MMHG

## 2019-02-18 PROBLEM — I95.1 ORTHOSTATIC HYPOTENSION: Status: ACTIVE | Noted: 2019-02-18

## 2019-02-18 PROBLEM — K92.2 UPPER GI BLEED: Status: ACTIVE | Noted: 2019-02-18

## 2019-02-18 LAB
HGB BLD-MCNC: 12.2 G/DL (ref 12–17)
HGB BLD-MCNC: 12.7 G/DL (ref 12–17)

## 2019-02-18 PROCEDURE — 88305 TISSUE EXAM BY PATHOLOGIST: CPT | Performed by: PATHOLOGY

## 2019-02-18 PROCEDURE — 0DB38ZX EXCISION OF LOWER ESOPHAGUS, VIA NATURAL OR ARTIFICIAL OPENING ENDOSCOPIC, DIAGNOSTIC: ICD-10-PCS | Performed by: FAMILY MEDICINE

## 2019-02-18 PROCEDURE — 88342 IMHCHEM/IMCYTCHM 1ST ANTB: CPT | Performed by: PATHOLOGY

## 2019-02-18 PROCEDURE — 83516 IMMUNOASSAY NONANTIBODY: CPT | Performed by: PHYSICIAN ASSISTANT

## 2019-02-18 PROCEDURE — 99238 HOSP IP/OBS DSCHRG MGMT 30/<: CPT | Performed by: FAMILY MEDICINE

## 2019-02-18 PROCEDURE — 0DB28ZX EXCISION OF MIDDLE ESOPHAGUS, VIA NATURAL OR ARTIFICIAL OPENING ENDOSCOPIC, DIAGNOSTIC: ICD-10-PCS | Performed by: FAMILY MEDICINE

## 2019-02-18 PROCEDURE — 86255 FLUORESCENT ANTIBODY SCREEN: CPT | Performed by: PHYSICIAN ASSISTANT

## 2019-02-18 PROCEDURE — 0DB68ZX EXCISION OF STOMACH, VIA NATURAL OR ARTIFICIAL OPENING ENDOSCOPIC, DIAGNOSTIC: ICD-10-PCS | Performed by: FAMILY MEDICINE

## 2019-02-18 PROCEDURE — C9113 INJ PANTOPRAZOLE SODIUM, VIA: HCPCS | Performed by: PHYSICIAN ASSISTANT

## 2019-02-18 PROCEDURE — 43239 EGD BIOPSY SINGLE/MULTIPLE: CPT | Performed by: INTERNAL MEDICINE

## 2019-02-18 PROCEDURE — 82784 ASSAY IGA/IGD/IGG/IGM EACH: CPT | Performed by: PHYSICIAN ASSISTANT

## 2019-02-18 PROCEDURE — 85018 HEMOGLOBIN: CPT | Performed by: HOSPITALIST

## 2019-02-18 PROCEDURE — 0DB98ZX EXCISION OF DUODENUM, VIA NATURAL OR ARTIFICIAL OPENING ENDOSCOPIC, DIAGNOSTIC: ICD-10-PCS | Performed by: INTERNAL MEDICINE

## 2019-02-18 RX ORDER — SODIUM CHLORIDE 9 MG/ML
125 INJECTION, SOLUTION INTRAVENOUS CONTINUOUS
Status: DISCONTINUED | OUTPATIENT
Start: 2019-02-18 | End: 2019-02-18 | Stop reason: HOSPADM

## 2019-02-18 RX ORDER — PANTOPRAZOLE SODIUM 40 MG/1
40 TABLET, DELAYED RELEASE ORAL
Qty: 60 TABLET | Refills: 0 | Status: SHIPPED | OUTPATIENT
Start: 2019-02-18 | End: 2019-05-13

## 2019-02-18 RX ORDER — MIDODRINE HYDROCHLORIDE 2.5 MG/1
2.5 TABLET ORAL
Qty: 90 TABLET | Refills: 0 | Status: SHIPPED | OUTPATIENT
Start: 2019-02-18

## 2019-02-18 RX ORDER — MIDODRINE HYDROCHLORIDE 5 MG/1
2.5 TABLET ORAL
Status: DISCONTINUED | OUTPATIENT
Start: 2019-02-18 | End: 2019-02-18 | Stop reason: HOSPADM

## 2019-02-18 RX ORDER — PROPOFOL 10 MG/ML
INJECTION, EMULSION INTRAVENOUS AS NEEDED
Status: DISCONTINUED | OUTPATIENT
Start: 2019-02-18 | End: 2019-02-18 | Stop reason: SURG

## 2019-02-18 RX ORDER — PANTOPRAZOLE SODIUM 40 MG/1
40 TABLET, DELAYED RELEASE ORAL
Status: DISCONTINUED | OUTPATIENT
Start: 2019-02-18 | End: 2019-02-18 | Stop reason: HOSPADM

## 2019-02-18 RX ADMIN — PROPOFOL 50 MG: 10 INJECTION, EMULSION INTRAVENOUS at 13:16

## 2019-02-18 RX ADMIN — MIDODRINE HYDROCHLORIDE 2.5 MG: 5 TABLET ORAL at 10:48

## 2019-02-18 RX ADMIN — PANTOPRAZOLE SODIUM 40 MG: 40 TABLET, DELAYED RELEASE ORAL at 16:28

## 2019-02-18 RX ADMIN — PROPOFOL 50 MG: 10 INJECTION, EMULSION INTRAVENOUS at 13:18

## 2019-02-18 RX ADMIN — PROPOFOL 50 MG: 10 INJECTION, EMULSION INTRAVENOUS at 13:11

## 2019-02-18 RX ADMIN — PANTOPRAZOLE SODIUM 40 MG: 40 INJECTION, POWDER, FOR SOLUTION INTRAVENOUS at 08:38

## 2019-02-18 RX ADMIN — SODIUM CHLORIDE 100 ML/HR: 0.9 INJECTION, SOLUTION INTRAVENOUS at 02:21

## 2019-02-18 RX ADMIN — PROPOFOL 50 MG: 10 INJECTION, EMULSION INTRAVENOUS at 13:13

## 2019-02-18 RX ADMIN — MIDODRINE HYDROCHLORIDE 2.5 MG: 5 TABLET ORAL at 16:28

## 2019-02-18 RX ADMIN — LIDOCAINE HYDROCHLORIDE 60 MG: 20 INJECTION, SOLUTION INTRAVENOUS at 13:11

## 2019-02-18 RX ADMIN — SODIUM CHLORIDE 2000 ML: 0.9 INJECTION, SOLUTION INTRAVENOUS at 10:10

## 2019-02-18 RX ADMIN — SODIUM CHLORIDE 125 ML/HR: 0.9 INJECTION, SOLUTION INTRAVENOUS at 13:00

## 2019-02-18 NOTE — MEDICAL STUDENT
MEDICAL STUDENT  Inpatient Progress Note for TRAINING ONLY  Not Part of Legal Medical Record       Progress Note - Sonal Garcia 19 y o  male MRN: [id number]    Unit/Bed#: [facility] 2 [facility] [address] [id number] Encounter: [id number]      Assessment/Plan:  ***      Subjective:   Patient is sitting comfortably in bed, visiting with his family  He states that he is feeling better today, denies any abdominal pain  He advanced from clears to regular diet yesterday, states he tolerated it well without any nausea or vomiting  He has been NPO since midnight for EGD today  Admits to some dizziness when he gets up and walks around his room  Denies nausea, vomiting, chest pain, shortness of breath, fever, chills, cough, diarrhea, constipation, blood in stool  Objective:     Vitals: Blood pressure (!) 86/55, pulse (!) 53, temperature 98 8 °F (37 1 °C), temperature source Temporal, resp  rate 16, weight 48 6 kg (107 lb 2 3 oz), SpO2 98 %  ,There is no height or weight on file to calculate BMI  Intake/Output Summary (Last 24 hours) at 2/18/2019 1039  Last data filed at 2/18/2019 0221  Gross per 24 hour   Intake 2028 33 ml   Output    Net 2028 33 ml       Physical Exam: Physical Exam   Constitutional: He is oriented to person, place, and time  No distress  thin   HENT:   Head: Normocephalic and atraumatic  Eyes: Pupils are equal, round, and reactive to light  Conjunctivae are normal  Right eye exhibits no discharge  Left eye exhibits no discharge  Neck: Normal range of motion  Cardiovascular: Normal rate, regular rhythm and normal heart sounds  Pulmonary/Chest: Effort normal and breath sounds normal    Abdominal: Soft  Bowel sounds are normal  He exhibits no distension  There is no tenderness  Musculoskeletal: He exhibits no edema or tenderness  Neurological: He is alert and oriented to person, place, and time  Skin: Skin is warm and dry  Psychiatric: He has a normal mood and affect   His behavior is normal    Vitals reviewed  Invasive Devices     Peripheral Intravenous Line            Peripheral IV 02/17/19 Right Forearm less than 1 day                Lab, Imaging and other studies: I have personally reviewed pertinent reports       Hemoglobin 12 0 - 17 0 g/dL 12 7        VTE Pharmacologic Prophylaxis: Reason for no pharmacologic prophylaxis GI bleed    Nikolas VELASQUEZ

## 2019-02-18 NOTE — ASSESSMENT & PLAN NOTE
Etiology of orthostatic hypotension is unknown  Per patient's family, patient has historically had lower than normal blood pressures  Administered 2 L normal saline bolus  Continue patient on midodrine 2 5 mg PO TID upon discharge and follow up with primary care physician as an outpatient

## 2019-02-18 NOTE — ASSESSMENT & PLAN NOTE
Patient presented with one episode of coffee-ground emesis  Hemoglobin has remained relatively stable since admission  Patient has been seen and evaluated by GI   EGD performed today showed-linear erosion versus Umu-Griffin a GE junction  Normal esophagus, gastric mucosal, duodenum mucosa  Small hiatal hernia  Cold forceps biopsies taken from esophagus, stomach, duodenum  Continue Protonix 40 mg PO BID for 30 days and then stop  Avoid NSAIDs  Patient will need outpatient follow-up with GI for CT of the abdomen and pelvis to work up unintentional weight loss and to follow up on biopsies  Encourage smoking and marijuana cessation  Patient is hemodynamically stable for discharge to home today

## 2019-02-18 NOTE — ASSESSMENT & PLAN NOTE
Etiology of orthostatic hypotension is unknown  Per patient's family, patient has historically had lower than normal blood pressures  Administered 2 L normal saline bolus  Started patient on midodrine 2 5 mg PO TID

## 2019-02-18 NOTE — PERIOPERATIVE NURSING NOTE
Dr Abhishek Kolb spoke with patient and his mother regarding procedural findings and future plan of care

## 2019-02-18 NOTE — PROGRESS NOTES
Progress Note - Braxton Rider 2000, 19 y o  male MRN: [id number]    Unit/Bed#: [facility] Encounter: [id number]    Primary Care Provider: No primary care provider on file  Date and time admitted to hospital: 2/16/2019  7:55 PM        * Upper GI bleed  Assessment & Plan  Patient presented with one episode of coffee-ground emesis  Hemoglobin has remained relatively stable since admission  Patient has been seen and evaluated by GI, and will have EGD performed today  Await EGD results and recommendations  Unintended weight loss  Assessment & Plan  Patient notes 18 lb weight loss over the past 3 months  Patient may need outpatient workup for malignancy  There may be a component of underlying psychiatric issues versus substance abuse  Orthostatic hypotension  Assessment & Plan  Etiology of orthostatic hypotension is unknown  Per patient's family, patient has historically had lower than normal blood pressures  Administered 2 L normal saline bolus  Started patient on midodrine 2 5 mg PO TID  Marijuana abuse  Assessment & Plan  Needs to quit  Probably contributing to nausea    Tobacco use  Assessment & Plan  Patient reports vaping daily   Nicotine patch while inpatient       Scoliosis  Assessment & Plan  Patient reports chronic back pain  Denies frequent NSAID use           VTE Pharmacologic Prophylaxis:   Pharmacologic: Pharmacologic VTE Prophylaxis contraindicated due to Upper GI bleed  Mechanical VTE Prophylaxis in Place: No    Patient Centered Rounds: I have performed bedside rounds with nursing staff today  Discussions with Specialists or Other Care Team Provider:  Yes    Education and Discussions with Family / Patient:  Yes    Time Spent for Care: 20 minutes  More than 50% of total time spent on counseling and coordination of care as described above      Current Length of Stay: 2 day(s)    Current Patient Status: Inpatient   Certification Statement: The patient will continue to require additional inpatient hospital stay due to Upper GI bleed    Discharge Plan: To be determined    Code Status: Level 1 - Full Code      Subjective:   Patient denies any chest pain or shortness of breath  He also denies any more episodes of coffee-ground emesis  Objective:     Vitals:   Temp (24hrs), Av 8 °F (37 1 °C), Min:97 9 °F (36 6 °C), Max:99 3 °F (37 4 °C)    Temp:  [97 9 °F (36 6 °C)-99 3 °F (37 4 °C)] 99 3 °F (37 4 °C)  HR:  [52-75] 75  Resp:  [16-18] 17  BP: ()/(45-75) 102/59  SpO2:  [93 %-100 %] 96 %  Body mass index is 14 51 kg/m²  Input and Output Summary (last 24 hours): Intake/Output Summary (Last 24 hours) at 2019 1400  Last data filed at 2019 1337  Gross per 24 hour   Intake 3628 33 ml   Output 0 ml   Net 3628 33 ml       Physical Exam:     Physical Exam   Constitutional: He is oriented to person, place, and time  He appears well-developed  No distress  Appears severely malnourished  HENT:   Head: Normocephalic and atraumatic  Eyes: Pupils are equal, round, and reactive to light  EOM are normal    Neck: Normal range of motion  Neck supple  No JVD present  Cardiovascular: Normal rate, regular rhythm and normal heart sounds  Exam reveals no gallop and no friction rub  No murmur heard  Pulmonary/Chest: Effort normal and breath sounds normal  No respiratory distress  He has no wheezes  Abdominal: Soft  Bowel sounds are normal  He exhibits no distension  There is no tenderness  Musculoskeletal: Normal range of motion  He exhibits no edema or tenderness  Neurological: He is alert and oriented to person, place, and time  Skin: Skin is warm and dry  He is not diaphoretic  Psychiatric: He has a normal mood and affect       Additional Data:     Labs:    Results from last 7 days   Lab Units 199  19   WBC Thousand/uL  --   --  5 89   HEMOGLOBIN g/dL 12 7   < > 13 5   HEMATOCRIT %  --   --  40 1   PLATELETS Thousands/uL  --   -- 225   NEUTROS PCT %  --   --  58   LYMPHS PCT %  --   --  27   MONOS PCT %  --   --  9   EOS PCT %  --   --  5    < > = values in this interval not displayed  Results from last 7 days   Lab Units 02/16/19 2030   SODIUM mmol/L 143   POTASSIUM mmol/L 3 5   CHLORIDE mmol/L 104   CO2 mmol/L 29   BUN mg/dL 11   CREATININE mg/dL 0 77   ANION GAP mmol/L 10   CALCIUM mg/dL 9 0   ALBUMIN g/dL 4 4   TOTAL BILIRUBIN mg/dL 0 92   ALK PHOS U/L 80   ALT U/L 21   AST U/L 14   GLUCOSE RANDOM mg/dL 96     Results from last 7 days   Lab Units 02/16/19 2030   INR  1 15             Results from last 7 days   Lab Units 02/16/19 2030   LACTIC ACID mmol/L 0 7           * I Have Reviewed All Lab Data Listed Above  * Additional Pertinent Lab Tests Reviewed: Angelina 66 Admission Reviewed    Imaging:    Imaging Reports Reviewed Today Include:  None available  Imaging Personally Reviewed by Myself Includes:  None    Recent Cultures (last 7 days):           Last 24 Hours Medication List:     Current Facility-Administered Medications:  LORazepam 0 5 mg Intravenous Q6H PRN Dolly oMnique PA-C    midodrine 2 5 mg Oral TID AC Latoya Colmenares MD    nicotine 1 patch Transdermal Daily Dolly Monique PA-C    ondansetron 4 mg Intravenous Q6H PRN Dolly Monique PA-C    pantoprazole 40 mg Intravenous Q12H [facility] Dolly Monique PA-C    sodium chloride 125 mL/hr Intravenous Continuous James Rubio DO Last Rate: 125 mL/hr (02/18/19 1300)        Today, Patient Was Seen By: Latoya Colmenares MD    ** Please Note: Dictation voice to text software may have been used in the creation of this document   **

## 2019-02-18 NOTE — ASSESSMENT & PLAN NOTE
Patient presented with one episode of coffee-ground emesis  Hemoglobin has remained relatively stable since admission  Patient has been seen and evaluated by GI, and will have EGD performed today  Await EGD results and recommendations

## 2019-02-18 NOTE — OP NOTE
OPERATIVE REPORT  PATIENT NAME: Tracey Juares    :  2000  MRN: [id number]  Pt Location: [facility] ROOM 01    SURGERY DATE: 2019    Surgeon(s) and Role:     * America Pulido MD - Primary    Preop Diagnosis:  Hematemesis with nausea [K92 0]  Coffee ground emesis [K92 0]  GI bleed [K92 2]  Unintended weight loss [R63 4]    Post-Op Diagnosis Codes:     * Hematemesis with nausea [K92 0]     * Coffee ground emesis [K92 0]     * GI bleed [K92 2]     * Unintended weight loss [R63 4]    Procedure(s) (LRB):  ESOPHAGOGASTRODUODENOSCOPY (EGD) with bx (N/A)     ESOPHAGOGASTRODUODENOSCOPY    PROCEDURE: EGD    SEDATION: Monitored anesthesia care, check anesthesia records    ASA Class: 2    INDICATIONS: weight loss, coffee ground emesis    CONSENT:  Informed consent was obtained for the procedure, including sedation after explaining the risks and benefits of the procedure  Risks including but not limited to bleeding, perforation, infection, and missed lesion  PREPARATION:   Telemetry, pulse oximetry, blood pressure were monitored throughout the procedure  Patient was identified by myself both verbally and by visual inspection of ID band  DESCRIPTION:   Patient was placed in the left lateral decubitus position and was sedated with the above medication  The gastroscope was introduced in to the oropharynx and the esophagus was intubated under direct visualization  Scope was passed down the esophagus up to 2nd part of the duodenum  A careful inspection was made as the gastroscope was withdrawn, including a retroflexed view of the stomach; findings and interventions are described below  FINDINGS:    #1  Esophagus- healed linear erosion vs Gearold Dines seen at the GE junction, with questionable tongue of Hoyos's esophagus, cold forcep biopsies taken  The remainder the esophagus appeared normal, cold forcep biopsies taken of the midesophagus to rule out eosinophilic esophagitis      #2  Stomach- normal appearing gastric mucosa, cold forcep biopsies taken of the gastric body and gastric antrum to rule out H pylori  Small hiatal hernia seen on GE junction retroflexed views  #3  Duodenum- normal appearing bulb, 2nd portion of the duodenum, cold forcep biopsies taken to rule out celiac disease  IMPRESSIONS:      Healed linear erosion vs Arvell Antonio at [employer]ita [employer] junction  Normal esophagus otherwise  Normal gastric mucosa  Normal duodenal mucosa  Small hiatal hernia  RECOMMENDATIONS:     Follow up biopsies  Resume regular diet  Continue PPI x 30 days, then stop  Avoid NSAIDs   Encouraged smoking cessation  Obtain CT abdomen/pelvis to evaluate for weight loss, which can be performed as outpatient  Ok to discharge from GI standpoint    COMPLICATIONS:  None; patient tolerated the procedure well      SPECIMENS:    ID Type Source Tests Collected by Time Destination   1 : duodenal bx Tissue Small Bowel, NOS TISSUE EXAM Pearl Duverney, MD 2/18/2019 1324    2 : gastric bx Tissue Stomach TISSUE EXAM Pearl Duverney, MD 2/18/2019 1324    3 : G-E junction bx r/o Barretts Tissue Esophagus TISSUE EXAM Pearl Duverney, MD 2/18/2019 1325    4 : mid-esophagus bx Tissue Esophagus TISSUE EXAM Pearl Duverney, MD 2/18/2019 1326        ESTIMATED BLOOD LOSS:  Minimal        SIGNATURE: Pearl Duverney, MD  DATE: February 18, 2019  TIME: 1:25 PM

## 2019-02-18 NOTE — ASSESSMENT & PLAN NOTE
Patient notes 18 lb weight loss over the past 3 months  Patient may need outpatient workup for malignancy  There may be a component of underlying psychiatric issues versus substance abuse

## 2019-02-18 NOTE — DISCHARGE SUMMARY
Discharge- Son Danyel 2000, 19 y o  male MRN: [id number]    Unit/Bed#: [facility] [address] 212-02 Encounter: [id number]    Primary Care Provider: No primary care provider on file  Date and time admitted to hospital: 2/16/2019  7:55 PM        * Upper GI bleed  Assessment & Plan  Patient presented with one episode of coffee-ground emesis  Hemoglobin has remained relatively stable since admission  Patient has been seen and evaluated by GI   EGD performed today showed-linear erosion versus Umu-Griffin a GE junction  Normal esophagus, gastric mucosal, duodenum mucosa  Small hiatal hernia  Cold forceps biopsies taken from esophagus, stomach, duodenum  Continue Protonix 40 mg PO BID for 30 days and then stop  Avoid NSAIDs  Patient will need outpatient follow-up with GI for CT of the abdomen and pelvis to work up unintentional weight loss and to follow up on biopsies  Encourage smoking and marijuana cessation  Patient is hemodynamically stable for discharge to home today  Unintended weight loss  Assessment & Plan  Patient notes 18 lb weight loss over the past 3 months  Patient may need outpatient workup for malignancy  There may be a component of underlying psychiatric issues versus substance abuse  Orthostatic hypotension  Assessment & Plan  Etiology of orthostatic hypotension is unknown  Per patient's family, patient has historically had lower than normal blood pressures  Administered 2 L normal saline bolus  Continue patient on midodrine 2 5 mg PO TID upon discharge and follow up with primary care physician as an outpatient  Marijuana abuse  Assessment & Plan  Needs to quit  Probably contributing to nausea    Tobacco use  Assessment & Plan  Patient reports vaping daily  Nicotine patch upon discharge        Scoliosis  Assessment & Plan  Patient reports chronic back pain  Denies frequent NSAID use             Discharging Physician / Practitioner: Jr Gomez MD  PCP: No primary care provider on file  Admission Date:   Admission Orders (From admission, onward)    Ordered        02/16/19 2128  Inpatient Admission (expected length of stay for this patient Order details is greater than two midnights)  Once     Order ID Start Status   [id number] 02/16/19 2128 Completed              Discharge Date: 02/18/19    Resolved Problems  Date Reviewed: 2/18/2019    None          Consultations During Hospital Stay:  · Gastroenterology    Procedures Performed:   · Esophagogastroduodenoscopy 02/18/2019  Significant Findings / Test Results:   EGD 2/18-linear erosion versus Umu-Griffin at GE junction  Normal esophagus, gastric mucosa, duodenal mucosa  Small hiatal hernia  Incidental Findings:   · None     Test Results Pending at Discharge (will require follow up): · Biopsies of esophagus, stomach, duodenum  Outpatient Tests Requested:  · CT of abdomen and pelvis to workup unintentional weight loss  Complications:  None    Reason for Admission:  Coffee-ground emesis    Hospital Course:     Dm Weber is a 19 y o  male patient who originally presented to the hospital on 2/16/2019 due to one episode of coffee-ground emesis which the patient experienced with some nausea and vomiting after he took 400 mg of ibuprofen for pain in his back  Patient states that he had never had an episode like this before  He was admitted to the medical-surgical unit of [address] [place] [address], and his hemoglobin and hematocrit have remained stable throughout his hospitalization  He was seen and evaluated by Gastroenterology and they recommended an EGD to further workup his symptoms  He had an EGD which showed a linear erosion versus a Umu-Griffin at the GE junction  Otherwise he had a normal appearing esophagus, gastric mucosa, and duodenal mucosa  Cold forceps biopsies were taken from the esophagus, stomach, and duodenum  The patient tolerated the procedure well    He also did explain that he had had unintentional weight loss of 18 lb in the last 3 months and frequent episodes of dizziness and lightheadedness associated with orthostatic hypotension  He was given a bolus of 2 L of normal saline and started on midodrine 2 5 mg PO TID  He will require further workup for his unintentional weight loss as an outpatient with a CT of the abdomen and pelvis as well as further workup for his orthostatic hypotension which he states he has had ever since he was a child  He is currently hemodynamically stable for discharge to home today  Please see above list of diagnoses and related plan for additional information  Condition at Discharge: stable     Discharge Day Visit / Exam:     * Please refer to separate progress note for these details *    Discussion with Family:  Yes with family at bedside    Discharge instructions/Information to patient and family:   See after visit summary for information provided to patient and family  Provisions for Follow-Up Care:  See after visit summary for information related to follow-up care and any pertinent home health orders  Disposition:     Home    For Discharges to [facility] SNF:   · Not Applicable to this Patient - Not Applicable to this Patient    Planned Readmission:  None     Discharge Statement:  I spent 19 minutes discharging the patient  This time was spent on the day of discharge  I had direct contact with the patient on the day of discharge  Greater than 50% of the total time was spent examining patient, answering all patient questions, arranging and discussing plan of care with patient as well as directly providing post-discharge instructions  Additional time then spent on discharge activities  Discharge Medications:  See after visit summary for reconciled discharge medications provided to patient and family        ** Please Note: This note has been constructed using a voice recognition system **

## 2019-02-18 NOTE — ANESTHESIA POSTPROCEDURE EVALUATION
Post-Op Assessment Note    CV Status:  Stable  Pain Score: 0    Pain management: adequate     Mental Status:  Alert and awake   Hydration Status:  Euvolemic   PONV Controlled:  Controlled   Airway Patency:  Patent   Post Op Vitals Reviewed: Yes      Staff: Anesthesiologist, CRNA           /59 (02/18/19 1345)    Temp      Pulse 75 (02/18/19 1345)   Resp 17 (02/18/19 1345)    SpO2 96 % (02/18/19 1345)

## 2019-02-19 LAB
ENDOMYSIUM IGA SER QL: NEGATIVE
GLIADIN PEPTIDE IGA SER-ACNC: 9 UNITS (ref 0–19)
GLIADIN PEPTIDE IGG SER-ACNC: 3 UNITS (ref 0–19)
IGA SERPL-MCNC: 220 MG/DL (ref 90–386)
TTG IGA SER-ACNC: <2 U/ML (ref 0–3)
TTG IGG SER-ACNC: <2 U/ML (ref 0–5)

## 2019-02-20 ENCOUNTER — TELEPHONE (OUTPATIENT)
Dept: GASTROENTEROLOGY | Facility: MEDICAL CENTER | Age: 19
End: 2019-02-20

## 2019-02-20 NOTE — TELEPHONE ENCOUNTER
Notes recorded by Arleen Jacinto MA on 2/20/2019 at 8:53 AM EST  Called patient lmom to call office    ----- Message from Harvey Rosario MD sent at 2/19/2019  5:32 PM EST -----  Please call patient with pathology results - findings consistent with eosinophilic esophagitis, should continue Protonix x8 weeks, and repeat EGD for biopsies  Will mail patient information - encourage mychart sign up for further communication

## 2019-02-22 ENCOUNTER — TELEPHONE (OUTPATIENT)
Dept: GASTROENTEROLOGY | Facility: MEDICAL CENTER | Age: 19
End: 2019-02-22

## 2019-02-22 NOTE — TELEPHONE ENCOUNTER
Notes recorded by Ave Wei MA on 2/22/2019 at 10:50 AM EST  Called patient lmom to call office  Letter mailed  ------    Notes recorded by Ave Wei MA on 2/20/2019 at 8:53 AM EST  Called patient lmom to call office  ------    Notes recorded by Talia García MD on 2/19/2019 at 5:32 PM EST  Please call patient with pathology results - findings consistent with eosinophilic esophagitis, should continue Protonix x8 weeks, and repeat EGD for biopsies     [facility] mail [facility] information [facility]

## 2019-02-25 ENCOUNTER — TELEPHONE (OUTPATIENT)
Dept: GASTROENTEROLOGY | Facility: CLINIC | Age: 19
End: 2019-02-25

## 2019-02-25 PROBLEM — K20.0 EOSINOPHILIC ESOPHAGITIS: Status: ACTIVE | Noted: 2019-02-25

## 2019-02-25 NOTE — TELEPHONE ENCOUNTER
Pt called wanting to know if he need to sched an office visit?  Pt was never reached for results and can be reached at [phone]

## 2019-02-25 NOTE — TELEPHONE ENCOUNTER
Results relayed, patient verbalized understanding  Patient is scheduled for repeat EGD on 5/9/19 with Jonel Khalil

## 2019-02-25 NOTE — TELEPHONE ENCOUNTER
Please return the pts call for his results   xfer his call to [facility] to schedule his egd

## 2019-03-03 NOTE — ASSESSMENT & PLAN NOTE
One episode of coffee ground emesis  No diarhea  No bloody nor dark stools  No NSAID use  No recent antibiotics  He has never had this problem before  Continue IV protonix  For EGD tomorrow Constitutional:  See HPI  Eyes:  No visual changes  ENMT: No neck pain or stiffness  Cardiac:  No chest pain  Respiratory:  See HPI  GI:  No nausea, vomiting, diarrhea or abdominal pain.  :  No dysuria, frequency or burning.  MS:  See hPI  Neuro:  No headache   Skin:  No skin rash  Except as documented in the HPI,  all other systems are negative

## 2019-05-08 ENCOUNTER — ANESTHESIA EVENT (OUTPATIENT)
Dept: GASTROENTEROLOGY | Facility: HOSPITAL | Age: 19
End: 2019-05-08
Payer: COMMERCIAL

## 2019-05-09 ENCOUNTER — HOSPITAL ENCOUNTER (OUTPATIENT)
Facility: HOSPITAL | Age: 19
Setting detail: OUTPATIENT SURGERY
Discharge: HOME | End: 2019-05-09
Attending: INTERNAL MEDICINE | Admitting: INTERNAL MEDICINE
Payer: COMMERCIAL

## 2019-05-09 ENCOUNTER — ANESTHESIA (OUTPATIENT)
Dept: GASTROENTEROLOGY | Facility: HOSPITAL | Age: 19
End: 2019-05-09
Payer: COMMERCIAL

## 2019-05-09 VITALS
TEMPERATURE: 98.3 F | SYSTOLIC BLOOD PRESSURE: 105 MMHG | DIASTOLIC BLOOD PRESSURE: 63 MMHG | RESPIRATION RATE: 20 BRPM | OXYGEN SATURATION: 100 % | HEART RATE: 83 BPM | BODY MASS INDEX: 14.49 KG/M2 | HEIGHT: 72 IN | WEIGHT: 107 LBS

## 2019-05-09 DIAGNOSIS — K20.0 EOSINOPHILIC ESOPHAGITIS: ICD-10-CM

## 2019-05-09 PROCEDURE — 88305 TISSUE EXAM BY PATHOLOGIST: CPT | Performed by: PATHOLOGY

## 2019-05-09 PROCEDURE — 43239 EGD BIOPSY SINGLE/MULTIPLE: CPT | Performed by: INTERNAL MEDICINE

## 2019-05-09 PROCEDURE — NC001 PR NO CHARGE: Performed by: INTERNAL MEDICINE

## 2019-05-09 RX ORDER — PROPOFOL 10 MG/ML
INJECTION, EMULSION INTRAVENOUS AS NEEDED
Status: DISCONTINUED | OUTPATIENT
Start: 2019-05-09 | End: 2019-05-09 | Stop reason: SURG

## 2019-05-09 RX ORDER — LIDOCAINE HYDROCHLORIDE 20 MG/ML
INJECTION, SOLUTION EPIDURAL; INFILTRATION; INTRACAUDAL; PERINEURAL AS NEEDED
Status: DISCONTINUED | OUTPATIENT
Start: 2019-05-09 | End: 2019-05-09 | Stop reason: SURG

## 2019-05-09 RX ORDER — OMEPRAZOLE 20 MG/1
20 CAPSULE, DELAYED RELEASE ORAL DAILY
COMMUNITY
End: 2019-05-13 | Stop reason: SDUPTHER

## 2019-05-09 RX ORDER — SODIUM CHLORIDE 9 MG/ML
125 INJECTION, SOLUTION INTRAVENOUS CONTINUOUS
Status: DISCONTINUED | OUTPATIENT
Start: 2019-05-09 | End: 2019-05-09 | Stop reason: HOSPADM

## 2019-05-09 RX ORDER — ALBUTEROL SULFATE 2.5 MG/3ML
2.5 SOLUTION RESPIRATORY (INHALATION) ONCE
Status: COMPLETED | OUTPATIENT
Start: 2019-05-09 | End: 2019-05-09

## 2019-05-09 RX ADMIN — SODIUM CHLORIDE: 0.9 INJECTION, SOLUTION INTRAVENOUS at 11:53

## 2019-05-09 RX ADMIN — PROPOFOL 10 MG: 10 INJECTION, EMULSION INTRAVENOUS at 12:02

## 2019-05-09 RX ADMIN — PROPOFOL 20 MG: 10 INJECTION, EMULSION INTRAVENOUS at 12:01

## 2019-05-09 RX ADMIN — PROPOFOL 20 MG: 10 INJECTION, EMULSION INTRAVENOUS at 12:05

## 2019-05-09 RX ADMIN — PROPOFOL 150 MG: 10 INJECTION, EMULSION INTRAVENOUS at 11:59

## 2019-05-09 RX ADMIN — PROPOFOL 20 MG: 10 INJECTION, EMULSION INTRAVENOUS at 12:03

## 2019-05-09 RX ADMIN — ALBUTEROL SULFATE 2.5 MG: 2.5 SOLUTION RESPIRATORY (INHALATION) at 12:50

## 2019-05-09 RX ADMIN — LIDOCAINE HYDROCHLORIDE 5 ML: 20 INJECTION, SOLUTION EPIDURAL; INFILTRATION; INTRACAUDAL; PERINEURAL at 11:59

## 2019-05-09 RX ADMIN — PROPOFOL 20 MG: 10 INJECTION, EMULSION INTRAVENOUS at 12:07

## 2019-05-13 DIAGNOSIS — K20.0 EOSINOPHILIC ESOPHAGITIS: Primary | ICD-10-CM

## 2019-05-13 RX ORDER — OMEPRAZOLE 20 MG/1
40 CAPSULE, DELAYED RELEASE ORAL
Qty: 60 CAPSULE | Refills: 3 | Status: SHIPPED | OUTPATIENT
Start: 2019-05-13

## 2019-05-13 RX ORDER — BUDESONIDE 1 MG/2ML
INHALANT ORAL
Qty: 120 ML | Refills: 3 | Status: SHIPPED | OUTPATIENT
Start: 2019-05-13

## 2019-06-01 ENCOUNTER — HOSPITAL ENCOUNTER (EMERGENCY)
Facility: HOSPITAL | Age: 19
Discharge: HOME | End: 2019-06-01
Attending: EMERGENCY MEDICINE | Admitting: EMERGENCY MEDICINE
Payer: COMMERCIAL

## 2019-06-01 ENCOUNTER — APPOINTMENT (EMERGENCY)
Dept: CT IMAGING | Facility: HOSPITAL | Age: 19
End: 2019-06-01
Payer: COMMERCIAL

## 2019-06-01 VITALS
TEMPERATURE: 98.3 F | BODY MASS INDEX: 14.77 KG/M2 | SYSTOLIC BLOOD PRESSURE: 100 MMHG | WEIGHT: 108.91 LBS | DIASTOLIC BLOOD PRESSURE: 61 MMHG | OXYGEN SATURATION: 97 % | HEART RATE: 55 BPM | RESPIRATION RATE: 16 BRPM

## 2019-06-01 DIAGNOSIS — R11.2 NAUSEA & VOMITING: Primary | ICD-10-CM

## 2019-06-01 LAB
ALBUMIN SERPL BCP-MCNC: 4.6 G/DL (ref 3.5–5)
ALP SERPL-CCNC: 82 U/L (ref 46–116)
ALT SERPL W P-5'-P-CCNC: 19 U/L (ref 12–78)
ANION GAP SERPL CALCULATED.3IONS-SCNC: 10 MMOL/L (ref 4–13)
AST SERPL W P-5'-P-CCNC: 11 U/L (ref 5–45)
BASOPHILS # BLD AUTO: 0.04 THOUSANDS/ΜL (ref 0–0.1)
BASOPHILS NFR BLD AUTO: 1 % (ref 0–1)
BILIRUB DIRECT SERPL-MCNC: 0.24 MG/DL (ref 0–0.2)
BILIRUB SERPL-MCNC: 1.24 MG/DL (ref 0.2–1)
BUN SERPL-MCNC: 14 MG/DL (ref 5–25)
CALCIUM SERPL-MCNC: 10 MG/DL (ref 8.3–10.1)
CHLORIDE SERPL-SCNC: 104 MMOL/L (ref 100–108)
CO2 SERPL-SCNC: 29 MMOL/L (ref 21–32)
CREAT SERPL-MCNC: 0.84 MG/DL (ref 0.6–1.3)
EOSINOPHIL # BLD AUTO: 0.56 THOUSAND/ΜL (ref 0–0.61)
EOSINOPHIL NFR BLD AUTO: 10 % (ref 0–6)
ERYTHROCYTE [DISTWIDTH] IN BLOOD BY AUTOMATED COUNT: 12.2 % (ref 11.6–15.1)
GFR SERPL CREATININE-BSD FRML MDRD: 125 ML/MIN/1.73SQ M
GLUCOSE SERPL-MCNC: 103 MG/DL (ref 65–140)
HCT VFR BLD AUTO: 47.6 % (ref 36.5–49.3)
HGB BLD-MCNC: 15.6 G/DL (ref 12–17)
IMM GRANULOCYTES # BLD AUTO: 0.01 THOUSAND/UL (ref 0–0.2)
IMM GRANULOCYTES NFR BLD AUTO: 0 % (ref 0–2)
LIPASE SERPL-CCNC: 95 U/L (ref 73–393)
LYMPHOCYTES # BLD AUTO: 1.54 THOUSANDS/ΜL (ref 0.6–4.47)
LYMPHOCYTES NFR BLD AUTO: 27 % (ref 14–44)
MCH RBC QN AUTO: 30.8 PG (ref 26.8–34.3)
MCHC RBC AUTO-ENTMCNC: 32.8 G/DL (ref 31.4–37.4)
MCV RBC AUTO: 94 FL (ref 82–98)
MONOCYTES # BLD AUTO: 0.53 THOUSAND/ΜL (ref 0.17–1.22)
MONOCYTES NFR BLD AUTO: 9 % (ref 4–12)
NEUTROPHILS # BLD AUTO: 2.95 THOUSANDS/ΜL (ref 1.85–7.62)
NEUTS SEG NFR BLD AUTO: 53 % (ref 43–75)
NRBC BLD AUTO-RTO: 0 /100 WBCS
PLATELET # BLD AUTO: 265 THOUSANDS/UL (ref 149–390)
PMV BLD AUTO: 9.6 FL (ref 8.9–12.7)
POTASSIUM SERPL-SCNC: 4 MMOL/L (ref 3.5–5.3)
PROT SERPL-MCNC: 8.4 G/DL (ref 6.4–8.2)
RBC # BLD AUTO: 5.07 MILLION/UL (ref 3.88–5.62)
SODIUM SERPL-SCNC: 143 MMOL/L (ref 136–145)
WBC # BLD AUTO: 5.63 THOUSAND/UL (ref 4.31–10.16)

## 2019-06-01 PROCEDURE — 80048 BASIC METABOLIC PNL TOTAL CA: CPT | Performed by: EMERGENCY MEDICINE

## 2019-06-01 PROCEDURE — 93005 ELECTROCARDIOGRAM TRACING: CPT

## 2019-06-01 PROCEDURE — 36415 COLL VENOUS BLD VENIPUNCTURE: CPT | Performed by: EMERGENCY MEDICINE

## 2019-06-01 PROCEDURE — 96361 HYDRATE IV INFUSION ADD-ON: CPT

## 2019-06-01 PROCEDURE — 83690 ASSAY OF LIPASE: CPT | Performed by: EMERGENCY MEDICINE

## 2019-06-01 PROCEDURE — 99284 EMERGENCY DEPT VISIT MOD MDM: CPT

## 2019-06-01 PROCEDURE — 96374 THER/PROPH/DIAG INJ IV PUSH: CPT

## 2019-06-01 PROCEDURE — 99284 EMERGENCY DEPT VISIT MOD MDM: CPT | Performed by: EMERGENCY MEDICINE

## 2019-06-01 PROCEDURE — 80076 HEPATIC FUNCTION PANEL: CPT | Performed by: EMERGENCY MEDICINE

## 2019-06-01 PROCEDURE — 74177 CT ABD & PELVIS W/CONTRAST: CPT

## 2019-06-01 PROCEDURE — 85025 COMPLETE CBC W/AUTO DIFF WBC: CPT | Performed by: EMERGENCY MEDICINE

## 2019-06-01 RX ORDER — ONDANSETRON 2 MG/ML
4 INJECTION INTRAMUSCULAR; INTRAVENOUS ONCE
Status: COMPLETED | OUTPATIENT
Start: 2019-06-01 | End: 2019-06-01

## 2019-06-01 RX ORDER — ONDANSETRON 4 MG/1
4 TABLET, ORALLY DISINTEGRATING ORAL EVERY 6 HOURS PRN
Qty: 20 TABLET | Refills: 0 | Status: SHIPPED | OUTPATIENT
Start: 2019-06-01

## 2019-06-01 RX ORDER — DICYCLOMINE HCL 20 MG
20 TABLET ORAL ONCE
Status: COMPLETED | OUTPATIENT
Start: 2019-06-01 | End: 2019-06-01

## 2019-06-01 RX ADMIN — IOHEXOL 100 ML: 350 INJECTION, SOLUTION INTRAVENOUS at 12:57

## 2019-06-01 RX ADMIN — ONDANSETRON 4 MG: 2 INJECTION INTRAMUSCULAR; INTRAVENOUS at 12:20

## 2019-06-01 RX ADMIN — SODIUM CHLORIDE 1000 ML: 0.9 INJECTION, SOLUTION INTRAVENOUS at 12:19

## 2019-06-01 RX ADMIN — DICYCLOMINE HYDROCHLORIDE 20 MG: 20 TABLET ORAL at 12:19

## 2019-06-02 LAB
ATRIAL RATE: 66 BPM
P AXIS: 77 DEGREES
PR INTERVAL: 138 MS
QRS AXIS: 87 DEGREES
QRSD INTERVAL: 76 MS
QT INTERVAL: 382 MS
QTC INTERVAL: 384 MS
T WAVE AXIS: 74 DEGREES
VENTRICULAR RATE: 61 BPM

## 2019-06-02 PROCEDURE — 93010 ELECTROCARDIOGRAM REPORT: CPT | Performed by: INTERNAL MEDICINE

## 2019-06-07 ENCOUNTER — OFFICE VISIT (OUTPATIENT)
Dept: GASTROENTEROLOGY | Facility: MEDICAL CENTER | Age: 19
End: 2019-06-07
Payer: COMMERCIAL

## 2019-06-07 VITALS
DIASTOLIC BLOOD PRESSURE: 72 MMHG | TEMPERATURE: 97.8 F | HEIGHT: 72 IN | BODY MASS INDEX: 14.9 KG/M2 | SYSTOLIC BLOOD PRESSURE: 110 MMHG | HEART RATE: 80 BPM | WEIGHT: 110 LBS

## 2019-06-07 DIAGNOSIS — K20.0 EOSINOPHILIC ESOPHAGITIS: ICD-10-CM

## 2019-06-07 DIAGNOSIS — F12.10 MARIJUANA ABUSE: ICD-10-CM

## 2019-06-07 DIAGNOSIS — R10.31 RIGHT LOWER QUADRANT ABDOMINAL PAIN: Primary | ICD-10-CM

## 2019-06-07 PROCEDURE — 99214 OFFICE O/P EST MOD 30 MIN: CPT | Performed by: INTERNAL MEDICINE

## 2019-06-07 RX ORDER — ALBUTEROL SULFATE 90 UG/1
2 AEROSOL, METERED RESPIRATORY (INHALATION)
Refills: 0 | COMMUNITY
Start: 2019-06-05

## 2024-08-16 ENCOUNTER — TELEPHONE (OUTPATIENT)
Age: 24
End: 2024-08-16

## 2024-08-16 NOTE — TELEPHONE ENCOUNTER
Patients GI provider:  Dr. Hammer    Number to return call: [phone]    Reason for call: Pt mom calling (not on consent and I told her I could not give her info)because he needs to have a procedure in [place] and had a reaction to a medication he had for a procedure in 2019 and needs to know the name of the medication that he had a reaction to. Please reach back out to the pt     Scheduled procedure/appointment date if applicable: Apt/procedure

## 2024-08-19 NOTE — TELEPHONE ENCOUNTER
Attempt to contact patient with information requested by his mother.  I asked him to call the office for the information.  His chart does not indicate any medication allergy, but it does have documentation of him having eosinophilic esophagitis.

## (undated) DEVICE — SINGLE-USE BIOPSY FORCEPS: Brand: RADIAL JAW 4